# Patient Record
Sex: FEMALE | Race: WHITE | NOT HISPANIC OR LATINO | URBAN - METROPOLITAN AREA
[De-identification: names, ages, dates, MRNs, and addresses within clinical notes are randomized per-mention and may not be internally consistent; named-entity substitution may affect disease eponyms.]

---

## 2019-08-08 ENCOUNTER — TELEPHONE (OUTPATIENT)
Dept: MATERNAL FETAL MEDICINE | Facility: CLINIC | Age: 29
End: 2019-08-08

## 2019-08-08 DIAGNOSIS — O28.0 ABNORMAL QUAD SCREEN: ICD-10-CM

## 2019-08-08 DIAGNOSIS — Z36.89 ENCOUNTER FOR FETAL ANATOMIC SURVEY: Primary | ICD-10-CM

## 2019-08-08 NOTE — TELEPHONE ENCOUNTER
Called to schedule pt for appt/us.  No answer at this time. LM for pt to return call to [phone]

## 2019-08-14 ENCOUNTER — INITIAL CONSULT (OUTPATIENT)
Dept: MATERNAL FETAL MEDICINE | Facility: CLINIC | Age: 29
End: 2019-08-14
Payer: MEDICAID

## 2019-08-14 ENCOUNTER — PROCEDURE VISIT (OUTPATIENT)
Dept: MATERNAL FETAL MEDICINE | Facility: CLINIC | Age: 29
End: 2019-08-14
Payer: MEDICAID

## 2019-08-14 VITALS — WEIGHT: 293 LBS | SYSTOLIC BLOOD PRESSURE: 116 MMHG | DIASTOLIC BLOOD PRESSURE: 60 MMHG

## 2019-08-14 DIAGNOSIS — Z36.89 ENCOUNTER FOR FETAL ANATOMIC SURVEY: ICD-10-CM

## 2019-08-14 DIAGNOSIS — O28.0 ABNORMAL QUAD SCREEN: ICD-10-CM

## 2019-08-14 DIAGNOSIS — O28.9 POSITIVE ANTENATAL SCREENING TEST: Primary | ICD-10-CM

## 2019-08-14 PROCEDURE — 76811 PR US, OB FETAL EVAL & EXAM, TRANSABDOM,FIRST GESTATION: ICD-10-PCS | Mod: 26,S$PBB,, | Performed by: OBSTETRICS & GYNECOLOGY

## 2019-08-14 PROCEDURE — 99213 OFFICE O/P EST LOW 20 MIN: CPT | Mod: PBBFAC,TH | Performed by: OBSTETRICS & GYNECOLOGY

## 2019-08-14 PROCEDURE — 99203 OFFICE O/P NEW LOW 30 MIN: CPT | Mod: 25,S$PBB,TH, | Performed by: OBSTETRICS & GYNECOLOGY

## 2019-08-14 PROCEDURE — 99999 PR PBB SHADOW E&M-EST. PATIENT-LVL III: CPT | Mod: PBBFAC,,, | Performed by: OBSTETRICS & GYNECOLOGY

## 2019-08-14 PROCEDURE — 76811 OB US DETAILED SNGL FETUS: CPT | Mod: PBBFAC | Performed by: OBSTETRICS & GYNECOLOGY

## 2019-08-14 PROCEDURE — 99203 PR OFFICE/OUTPT VISIT, NEW, LEVL III, 30-44 MIN: ICD-10-PCS | Mod: 25,S$PBB,TH, | Performed by: OBSTETRICS & GYNECOLOGY

## 2019-08-14 PROCEDURE — 76811 OB US DETAILED SNGL FETUS: CPT | Mod: 26,S$PBB,, | Performed by: OBSTETRICS & GYNECOLOGY

## 2019-08-14 PROCEDURE — 99999 PR PBB SHADOW E&M-EST. PATIENT-LVL III: ICD-10-PCS | Mod: PBBFAC,,, | Performed by: OBSTETRICS & GYNECOLOGY

## 2019-08-14 RX ORDER — SERTRALINE HYDROCHLORIDE 50 MG/1
50 TABLET, FILM COATED ORAL DAILY
COMMUNITY

## 2019-08-14 RX ORDER — NITROFURANTOIN 25; 75 MG/1; MG/1
100 CAPSULE ORAL
COMMUNITY

## 2019-08-14 NOTE — PROGRESS NOTES
OB Ultrasound Report (see PDF version under imaging tab) and consult note    Indication  ========    New Consult: Fetal anatomy survey/+DSR on AFP Tetra    History  ======    General History  Height 168 cm  Height (ft) 5 ft  Height (in) 6 in  Previous Outcomes   2  Para 1  Preg. no. 1  Outcome: live YOB: 2015  Gest. age 40 w + 0 d  Gender: female  Details: 8 lbs 1 oz  Risk Factors  Details: BMI  Details: PP Depression  Details: CF Carrier    Pregnancy History  ===============    Maternal Lab Tests  Test: AFP Tetra  Result of other maternal screening test: DSR: 1:223  T18R: 1: 2153    Maternal Assessment  ================    Height 168 cm  Height (ft) 5 ft  Height (in) 6 in  Weight 136 kg  Weight (lb) 300 lb  BMI 48.39 kg/m²  BP syst 116 mmHg  BP diast 60 mmHg    Method  ======    2D Color Doppler, [place]on E10, Transabdominal ultrasound examination. View: Suboptimal view: limited by maternal body habitus.  Suboptimal view: limited by fetal position    Pregnancy  =========    Rayo pregnancy. Number of fetuses: 1    Dating  ======    Cycle: regular cycle  GA by prior assessment 19 w + 3 d  CLAIRE by prior assessment: 2020  Ultrasound examination on: 2019  GA by U/S based upon: AC, BPD, Femur, HC  GA by U/S 20 w + 1 d  CLAIRE by U/S: 2019  Assigned: based on stated CLAIRE, selected on 2019  Assigned GA 19 w + 3 d  Assigned CLAIRE: 2020  Pregnancy length 280 d    General Evaluation  ===============    Cardiac activity present.  bpm.  Fetal movements visualized.  Presentation Variable.  Placenta Placental site: fundal.  Umbilical cord Cord vessels: 3 vessel cord. Insertion site: normal insertion.  Amniotic fluid Amount of AF: normal amount.    Fetal Biometry  ===========    Standard  BPD 45.6 mm  19w 5d                Hadlock    OFD 59.6 mm  20w 5d                Liza    .7 mm  19w 5d                Hadlock    Cerebellum tr 21.5 mm  21w 0d                 Cortes    Nuchal fold 4.6 mm  .1 mm  20w 5d                Hadlock    Femur 32.6 mm  20w 1d                Hadlock    Humerus 32.3 mm  20w 6d                Liza    HC / AC 1.09     g    EFW (lb) 0 lb  EFW (oz) 12 oz  EFW by: Hadlock (BPD-HC-AC-FL)  Extended  CM 3.7 mm    Head / Face / Neck  Cephalic index 0.77    Extremities / Bony Struc  FL / BPD 0.71    FL / AC 0.21    Other Structures   bpm    Fetal Anatomy  ===========    Cranium: normal  Lateral ventricles: suboptimal  Choroid plexus: normal  Midline falx: normal  Cavum septi pellucidi: normal  Cerebellum: normal  Cisterna magna: normal  Head / Neck  Rt lateral ventricle: suboptimal  Lt lateral ventricle: suboptimal  Vermis: suboptimal  Neck: normal  Nuchal fold: normal  Lips: suboptimal  Profile: suboptimal  Nose: suboptimal  4-chamber view: suboptimal  RVOT view: suboptimal  LVOT view: suboptimal  Heart / Thorax  4-chamber view: septum suboptimal  Situs: situs solitus (normal)  Aortic arch view: suboptimal  Ductal arch view: suboptimal  SVC: suboptimal  IVC: suboptimal  3-vessel view: suboptimal  3-vessel-trachea view: suboptimal  Rt lung: suboptimal  Lt lung: suboptimal  Diaphragm: suboptimal  Cord insertion: normal  Stomach: normal  Kidneys: normal  Bladder: normal  Genitals: normal  Abdomen  Liver: normal  Bowel: normal  Rt renal artery: normal  Lt renal artery: normal  Cervical spine: normal  Thoracic spine: normal  Lumbar spine: normal  Sacral spine: normal  Spine: transverse views of spine sub opt  Rt arm: suboptimal  Lt arm: normal  Rt hand: suboptimal  Lt hand: suboptimal  Rt leg: normal  Lt leg: normal  Position of hands: suboptimal  Position of feet: normal  Gender: male  Wants to know gender: yes    Maternal Structures  ===============    Uterus / Cervix  Uterus: Normal  Approach: Transabdominal  Cervical length 60.6 mm  Ovaries / Tubes / Adnexa  Rt ovary: Not visualized  Lt  ovary: Normal    Consultation  ==========    Type: Abnormal maternal serum screen  I spent 30 minutes on the consultation today with the patient and her spouse regarding results from the quad screen and regarding  findings from today's ultrasound exam. More than 50% of the consultation was spent in face-to-face counseling and coordination of  care.    Referring MD: Dr. Cerna    The patient's medical, obstetrical and family histories were reviewed and are noncontributory. The patient and her spouse deny any  known relatives with Down Syndrome, other chromosome/genetic disorders, or serious birth defects or developmental delay.    Consultation performed for abnormal second trimester serum screening result. The patient's serum screen showed a risk for Down  Syndrome (DS) of 1 in 223. The risk for trisomy 18 and open neural tube defects (NTDs) was not elevated. We reviewed the testing  options for further evaluation of fetal Down Syndrome. Specifically, we discussed further screening with cell-free DNA (cfDNA) testing  and reviewed the benefits, limitations, and test performance characteristics of cfDNA testing. Ultrasound as a screening tool for DS  was reviewed also. The sensitivity of prenatal ultrasound for the detection of DS ranges from 50 - 90%; thus, it remains a screening  test with limited ability to confirm or exclude DS. We also discussed genetic amniocentesis as the diagnostic prenatal testing option for  detection of DS. The incomplete nature of the anatomic survey today was reviewed.  The risks, benefits, and test performance characteristics of amniocentesis were reviewed. A risk of approximately 1 in 1000 for  pregnancy loss post-amniocentesis was also reviewed. After discussion, the patient elected to proceed with f/u ultrasound exam in 4 -  6 weeks to complete the fetal anatomic survey. This study will be scheduled at our [place] office for patient convenience.  At this point, the patient is uncertain  as to whether to pursue cell-free DNA testing. She will inform her OB if she decides to have this  testing done. She declines genetic amniocentesis.    Although the patient was not referred for this indication, we briefly discussed that she is a known carrier for cystic fibrosis (CF), per  her report. Her spouse has not undergone carrier screening for CF, believing that the lack of family history of CF meant he was very  unlikely to be a carrier. We reviewed that in disorders with AR inheritance, no or few other affected family members may be present.  Her spouse indicated that he will likely proceed with CF carrier screening at some point in the future.    Impression  =========    A detailed fetal anatomic ultrasound examination was performed for the following high risk indication: abnormal quad screen. No fetal  structural malformations are identified; however, fetal imaging is incomplete today. A follow-up study will be scheduled to complete the  fetal anatomic survey. Fetal size today is consistent with established gestational age. Cervical length is normal. Placental location is  normal without evidence of previa.

## 2019-08-14 NOTE — LETTER
August 14, 2019      Juan Cerna MD  [id number]  [facility] [address]           [facility]  [address] [address]  Phone: [phone]          Patient: Sweta Harman   MR Number: [id number]   YOB: 1990   Date of Visit: 8/14/2019       Dear Dr. Juan Cerna:    Thank you for referring Sweta Harman to me for evaluation. Attached you will find relevant portions of my assessment and plan of care.    If you have questions, please do not hesitate to call me. I look forward to following Sweta Harman along with you.    Sincerely,    Shaunna Drake MD    Enclosure  CC:  No Recipients    If you would like to receive this communication electronically, please contact [email or web address] or [phone] to request more information on [employer] Link access.    For providers and/or their staff who would like to refer a patient to [facility], please contact us through our one-stop-shop provider referral line, [facility], at [phone].    If you feel you have received this communication in error or would no longer like to receive these types of communications, please e-mail [email or web address]

## 2019-09-11 ENCOUNTER — PROCEDURE VISIT (OUTPATIENT)
Dept: MATERNAL FETAL MEDICINE | Facility: CLINIC | Age: 29
End: 2019-09-11
Payer: MEDICAID

## 2019-09-11 VITALS — WEIGHT: 293 LBS | SYSTOLIC BLOOD PRESSURE: 100 MMHG | DIASTOLIC BLOOD PRESSURE: 72 MMHG

## 2019-09-11 DIAGNOSIS — O28.9 POSITIVE ANTENATAL SCREENING TEST: ICD-10-CM

## 2019-09-11 DIAGNOSIS — Z36.89 ENCOUNTER FOR ULTRASOUND TO ASSESS FETAL GROWTH: Primary | ICD-10-CM

## 2019-09-11 PROCEDURE — 99499 NO LOS: ICD-10-PCS | Mod: ,,, | Performed by: OBSTETRICS & GYNECOLOGY

## 2019-09-11 PROCEDURE — 76816 OB US FOLLOW-UP PER FETUS: CPT | Mod: ,,, | Performed by: OBSTETRICS & GYNECOLOGY

## 2019-09-11 PROCEDURE — 76816 PR  US,PREGNANT UTERUS,F/U,TRANSABD APP: ICD-10-PCS | Mod: ,,, | Performed by: OBSTETRICS & GYNECOLOGY

## 2019-09-11 PROCEDURE — 99499 UNLISTED E&M SERVICE: CPT | Mod: ,,, | Performed by: OBSTETRICS & GYNECOLOGY

## 2019-10-07 ENCOUNTER — TELEPHONE (OUTPATIENT)
Dept: MATERNAL FETAL MEDICINE | Facility: CLINIC | Age: 29
End: 2019-10-07

## 2019-10-22 ENCOUNTER — TELEPHONE (OUTPATIENT)
Dept: MATERNAL FETAL MEDICINE | Facility: CLINIC | Age: 29
End: 2019-10-22

## 2019-11-19 ENCOUNTER — TELEPHONE (OUTPATIENT)
Dept: MATERNAL FETAL MEDICINE | Facility: CLINIC | Age: 29
End: 2019-11-19

## 2019-11-19 NOTE — TELEPHONE ENCOUNTER
Reminder call made to patient. No answer at this time. LM instructing pt to arrive at [phone] and to call [phone] for any questions.

## 2019-11-20 ENCOUNTER — OFFICE VISIT (OUTPATIENT)
Dept: MATERNAL FETAL MEDICINE | Facility: CLINIC | Age: 29
End: 2019-11-20
Payer: MEDICAID

## 2019-11-20 VITALS — WEIGHT: 293 LBS | DIASTOLIC BLOOD PRESSURE: 71 MMHG | SYSTOLIC BLOOD PRESSURE: 142 MMHG

## 2019-11-20 DIAGNOSIS — O99.213 OBESITY AFFECTING PREGNANCY IN THIRD TRIMESTER: ICD-10-CM

## 2019-11-20 DIAGNOSIS — O28.0 ABNORMAL QUAD SCREEN: ICD-10-CM

## 2019-11-20 DIAGNOSIS — O28.9 POSITIVE ANTENATAL SCREENING TEST: ICD-10-CM

## 2019-11-20 DIAGNOSIS — Z36.89 ENCOUNTER FOR ULTRASOUND TO ASSESS FETAL GROWTH: ICD-10-CM

## 2019-11-20 PROCEDURE — 76816 PR  US,PREGNANT UTERUS,F/U,TRANSABD APP: ICD-10-PCS | Mod: ,,, | Performed by: OBSTETRICS & GYNECOLOGY

## 2019-11-20 PROCEDURE — 99499 UNLISTED E&M SERVICE: CPT | Mod: ,,, | Performed by: OBSTETRICS & GYNECOLOGY

## 2019-11-20 PROCEDURE — 99499 NO LOS: ICD-10-PCS | Mod: ,,, | Performed by: OBSTETRICS & GYNECOLOGY

## 2019-11-20 PROCEDURE — 76816 OB US FOLLOW-UP PER FETUS: CPT | Mod: ,,, | Performed by: OBSTETRICS & GYNECOLOGY

## 2019-11-20 NOTE — LETTER
November 20, 2019      Kurt Cerna MD  [address] [place] [address] [place] [address]           [facility]  [address] [facility] , [address]  [place] [address]  Phone: [phone]          Patient: Sweta Harman   MR Number: [id number]   YOB: 1990   Date of Visit: 11/20/2019       Dear Dr. Kurt Cerna:    Thank you for referring Sweta Harman to me for evaluation. Attached you will find relevant portions of my assessment and plan of care.    If you have questions, please do not hesitate to call me. I look forward to following Sweta Harman along with you.    Sincerely,    Chance Mcconnell MD    Enclosure  CC:  No Recipients    If you would like to receive this communication electronically, please contact [email or web address] or [phone] to request more information on [employer] access.    For providers and/or their staff who would like to refer a patient to [facility], please contact us through our one-stop-shop provider referral line, [facility] , at [phone].    If you feel you have received this communication in error or would no longer like to receive these types of communications, please e-mail [email or web address]

## 2020-10-26 PROBLEM — O28.0 ABNORMAL QUAD SCREEN: Status: RESOLVED | Noted: 2019-11-20 | Resolved: 2020-10-26

## 2020-12-11 PROBLEM — F32.9 MAJOR DEPRESSIVE DISORDER: Status: ACTIVE | Noted: 2020-12-11

## 2020-12-11 PROBLEM — F69 RAGE ATTACKS: Status: ACTIVE | Noted: 2020-12-11

## 2020-12-11 PROBLEM — F41.9 ANXIETY: Status: ACTIVE | Noted: 2020-12-11

## 2021-04-23 PROBLEM — E66.01 MORBID OBESITY WITH BMI OF 45.0-49.9, ADULT: Status: ACTIVE | Noted: 2021-04-23
